# Patient Record
Sex: FEMALE | Race: WHITE
[De-identification: names, ages, dates, MRNs, and addresses within clinical notes are randomized per-mention and may not be internally consistent; named-entity substitution may affect disease eponyms.]

---

## 2019-02-26 ENCOUNTER — HOSPITAL ENCOUNTER (OUTPATIENT)
Dept: HOSPITAL 89 - MAMO | Age: 59
End: 2019-02-26
Attending: OBSTETRICS & GYNECOLOGY
Payer: COMMERCIAL

## 2019-02-26 DIAGNOSIS — Z12.31: Primary | ICD-10-CM

## 2019-02-26 PROCEDURE — 77067 SCR MAMMO BI INCL CAD: CPT

## 2019-02-26 PROCEDURE — 77063 BREAST TOMOSYNTHESIS BI: CPT

## 2019-02-26 NOTE — RADIOLOGY IMAGING REPORT
FACILITY: [facility] 

 

PATIENT NAME: RONNY GURROLA

: [id number]

MR: [id number]

V: [id number]

EXAM DATE: 

ORDERING PHYSICIAN: RENUKA MATTHEWS

TECHNOLOGIST: Mirtha López

 

PROCEDURE:BILATERAL DIGITAL SCREENING MAMMOGRAM WITH CAD ASSISTED 

INTERPRETATION & 3D TOMOSYNTHESIS 

 

COMPARISON:Prior mammograms 11/3/17, 16, 14, 13, 

12.

 

INDICATIONS:screening

 

FINDINGS:  

The breasts are heterogeneously dense which can obscure small masses. 

The parenchymal pattern has remained stable allowing for difference in 

mammographic technique & patient positioning.

 

DIAGNOSTIC CATEGORY 1--NEGATIVE.  

 

RECOMMENDATIONS:

ROUTINE MAMMOGRAM AND CLINICAL EVALUATION.   

 

IMPRESSION:

BIRADS 1: Negative. 

No significant abnormality is seen.

 

 

 

 

 

Dictated by:  Sakina Gore M.D. on 2019 at 11:47   

Transcribed by: FIX on 2019 at 11:53    

Approved by:  Sakina Gore M.D. on 2019 at 17:14   

[facility]